# Patient Record
Sex: MALE | Race: WHITE
[De-identification: names, ages, dates, MRNs, and addresses within clinical notes are randomized per-mention and may not be internally consistent; named-entity substitution may affect disease eponyms.]

---

## 2020-04-03 ENCOUNTER — HOSPITAL ENCOUNTER (EMERGENCY)
Dept: HOSPITAL 96 - M.ERS | Age: 70
Discharge: HOME | End: 2020-04-03
Payer: COMMERCIAL

## 2020-04-03 VITALS — HEIGHT: 67 IN | WEIGHT: 200 LBS | BODY MASS INDEX: 31.39 KG/M2

## 2020-04-03 VITALS — DIASTOLIC BLOOD PRESSURE: 70 MMHG | SYSTOLIC BLOOD PRESSURE: 125 MMHG

## 2020-04-03 DIAGNOSIS — I10: ICD-10-CM

## 2020-04-03 DIAGNOSIS — E78.00: ICD-10-CM

## 2020-04-03 DIAGNOSIS — Z96.651: ICD-10-CM

## 2020-04-03 DIAGNOSIS — E11.649: Primary | ICD-10-CM

## 2020-04-03 LAB
ABSOLUTE BASOPHILS: 0 THOU/UL (ref 0–0.2)
ABSOLUTE EOSINOPHILS: 0 THOU/UL (ref 0–0.7)
ABSOLUTE MONOCYTES: 0.4 THOU/UL (ref 0–1.2)
ALBUMIN SERPL-MCNC: 4.2 G/DL (ref 3.4–5)
ALP SERPL-CCNC: 52 U/L (ref 46–116)
ALT SERPL-CCNC: 17 U/L (ref 30–65)
ANION GAP SERPL CALC-SCNC: 10 MMOL/L (ref 7–16)
APTT BLD: 26 SECONDS (ref 25–31.3)
AST SERPL-CCNC: 16 U/L (ref 15–37)
BASOPHILS NFR BLD AUTO: 0.6 %
BILIRUB SERPL-MCNC: 0.4 MG/DL
BUN SERPL-MCNC: 9 MG/DL (ref 7–18)
CALCIUM SERPL-MCNC: 8.6 MG/DL (ref 8.5–10.1)
CHLORIDE SERPL-SCNC: 101 MMOL/L (ref 98–107)
CO2 SERPL-SCNC: 27 MMOL/L (ref 21–32)
CREAT SERPL-MCNC: 1.1 MG/DL (ref 0.6–1.3)
EOSINOPHIL NFR BLD: 0.1 %
GLUCOSE SERPL-MCNC: 121 MG/DL (ref 70–99)
GRANULOCYTES NFR BLD MANUAL: 84.9 %
HCT VFR BLD CALC: 43.3 % (ref 42–52)
HGB BLD-MCNC: 14.4 GM/DL (ref 14–18)
INR PPP: 1
LYMPHOCYTES # BLD: 0.7 THOU/UL (ref 0.8–5.3)
LYMPHOCYTES NFR BLD AUTO: 9.7 %
MCH RBC QN AUTO: 29.9 PG (ref 26–34)
MCHC RBC AUTO-ENTMCNC: 33.3 G/DL (ref 28–37)
MCV RBC: 89.8 FL (ref 80–100)
MONOCYTES NFR BLD: 4.7 %
MPV: 8.9 FL. (ref 7.2–11.1)
NEUTROPHILS # BLD: 6.5 THOU/UL (ref 1.6–8.1)
NT-PRO BRAIN NAT PEPTIDE: 180 PG/ML (ref ?–300)
NUCLEATED RBCS: 0 /100WBC
PLATELET COUNT*: 270 THOU/UL (ref 150–400)
POTASSIUM SERPL-SCNC: 3.5 MMOL/L (ref 3.5–5.1)
PROT SERPL-MCNC: 8 G/DL (ref 6.4–8.2)
PROTHROMBIN TIME: 10.7 SECONDS (ref 9.2–11.5)
RBC # BLD AUTO: 4.82 MIL/UL (ref 4.5–6)
RDW-CV: 15.3 % (ref 10.5–14.5)
SODIUM SERPL-SCNC: 138 MMOL/L (ref 136–145)
WBC # BLD AUTO: 7.7 THOU/UL (ref 4–11)

## 2020-04-04 NOTE — EKG
[place] [address]
Phone:  [phone]                     ELECTROCARDIOGRAM REPORT      
_______________________________________________________________________________
 
Name:         ARIANNA HERNANDEZ              Room:                     [facility]#:    [id number]     Account #:     [id number]  
Admission:    20    Attend Phys:                     
Discharge:    20    Date of Birth: [birth year]  
Date of Service: 20  Report #:      [id number]
        [id number]
_______________________________________________________________________________
THIS REPORT FOR:  //name//                      
 
                         [facility] ED
                                       
Test Date:    2020               Test Time:    17:40:19
Pat Name:     ARIANNA HERNANDEZ           Department:   
Patient ID:   [id number]            Room:          
Gender:                               Technician:   SHORT
:          1950               Requested By: Tunde Conrad
Order Number: [id number] MD:   Nabor Nguyen
                                 Measurements
Intervals                              Axis          
Rate:         82                       P:            5
NC:           196                      QRS:          -15
QRSD:         91                       T:            -19
QT:           377                                    
QTc:          441                                    
                           Interpretive Statements
Sinus rhythm
Abnormal R-wave progression, early transition
Left ventricular hypertrophy
Nonspecific T abnormalities, inferior leads
Compared to ECG 2011 08:53:09
Left ventricular hypertrophy now present
T-wave abnormality now present
 
Electronically Signed On 2020 12:57:37 CDT by Nabor Nguyen
[email or web address]
 
 
 
 
 
 
 
 
 
 
 
 
 
 
 
 
  <ELECTRONICALLY SIGNED>
                                           By: Nabor Nguyen MD, FACC   
  20     1257
D: 20 1740   _____________________________________
T: 20 1740   Nabor Nguyen MD, FACC     /EPI